# Patient Record
Sex: MALE | URBAN - METROPOLITAN AREA
[De-identification: names, ages, dates, MRNs, and addresses within clinical notes are randomized per-mention and may not be internally consistent; named-entity substitution may affect disease eponyms.]

---

## 2018-09-24 ENCOUNTER — APPOINTMENT (RX ONLY)
Dept: URBAN - METROPOLITAN AREA CLINIC 151 | Facility: CLINIC | Age: 28
Setting detail: DERMATOLOGY
End: 2018-09-24

## 2018-09-24 DIAGNOSIS — L27.1 LOCALIZED SKIN ERUPTION DUE TO DRUGS AND MEDICAMENTS TAKEN INTERNALLY: ICD-10-CM

## 2018-09-24 PROCEDURE — ? PRESCRIPTION

## 2018-09-24 PROCEDURE — ? DIAGNOSIS COMMENT

## 2018-09-24 PROCEDURE — ? PATIENT SPECIFIC COUNSELING

## 2018-09-24 PROCEDURE — 99202 OFFICE O/P NEW SF 15 MIN: CPT

## 2018-09-24 RX ORDER — TACROLIMUS 1 MG/G
OINTMENT TOPICAL BID
Qty: 1 | Refills: 3 | Status: ERX | COMMUNITY
Start: 2018-09-24

## 2018-09-24 RX ADMIN — TACROLIMUS: 1 OINTMENT TOPICAL at 19:19

## 2018-09-24 ASSESSMENT — SEVERITY ASSESSMENT: SEVERITY: MODERATE

## 2018-09-24 ASSESSMENT — LOCATION ZONE DERM: LOCATION ZONE: ARM

## 2018-09-24 ASSESSMENT — LOCATION DETAILED DESCRIPTION DERM: LOCATION DETAILED: LEFT VENTRAL PROXIMAL FOREARM

## 2018-09-24 ASSESSMENT — LOCATION SIMPLE DESCRIPTION DERM: LOCATION SIMPLE: LEFT FOREARM

## 2018-09-24 ASSESSMENT — PAIN INTENSITY VAS: HOW INTENSE IS YOUR PAIN 0 BEING NO PAIN, 10 BEING THE MOST SEVERE PAIN POSSIBLE?: NO PAIN

## 2018-09-24 NOTE — PROCEDURE: PATIENT SPECIFIC COUNSELING
Detail Level: Zone
Patient to keep a record of as needed (PRN) medications including laxatives and NSAIDs.

## 2018-09-24 NOTE — HPI: RASH (ECZEMA)
How Severe Is Your Eczema?: mild
Is This A New Presentation, Or A Follow-Up?: Rash
Additional History: New patient who wants two patches of eczema ( upper L cheek, R dorsal foot) to be examined. Wants something stronger than hydrocortisone.   Since childhood has had eczema. First started with foot last winter.  Five week history on the left cheek.  Applying hydrocortisone.\\n\\nDoes not recall taking any as needed (PRN) medications prior to the appearance of the spot on his left cheek.  Foot lesion comes and goes.

## 2018-09-24 NOTE — PROCEDURE: DIAGNOSIS COMMENT
Comment: I favor a FDE but there is no good drug history to suggest this.  There is prominent post inflammatory hyperpigmentation.